# Patient Record
Sex: MALE | URBAN - METROPOLITAN AREA
[De-identification: names, ages, dates, MRNs, and addresses within clinical notes are randomized per-mention and may not be internally consistent; named-entity substitution may affect disease eponyms.]

---

## 2023-05-18 ASSESSMENT — PROMIS GLOBAL HEALTH SCALE
RATE_AVERAGE_FATIGUE: MODERATE
RATE_PHYSICAL_HEALTH: FAIR
RATE_MENTAL_HEALTH: GOOD
RATE_QUALITY_OF_LIFE: FAIR
RATE_GENERAL_HEALTH: FAIR
RATE_SOCIAL_SATISFACTION: GOOD
RATE_AVERAGE_PAIN: 3
CARRYOUT_SOCIAL_ACTIVITIES: FAIR
EMOTIONAL_PROBLEMS: SOMETIMES
CARRYOUT_PHYSICAL_ACTIVITIES: MOSTLY

## 2023-05-19 ENCOUNTER — OFFICE VISIT (OUTPATIENT)
Dept: PRIMARY CARE | Facility: CLINIC | Age: 58
End: 2023-05-19
Payer: COMMERCIAL

## 2023-05-19 VITALS
SYSTOLIC BLOOD PRESSURE: 128 MMHG | DIASTOLIC BLOOD PRESSURE: 88 MMHG | TEMPERATURE: 98.3 F | HEART RATE: 53 BPM | OXYGEN SATURATION: 95 % | WEIGHT: 232 LBS | RESPIRATION RATE: 18 BRPM | BODY MASS INDEX: 29.79 KG/M2

## 2023-05-19 DIAGNOSIS — Z00.00 HEALTHCARE MAINTENANCE: ICD-10-CM

## 2023-05-19 DIAGNOSIS — E78.5 HYPERLIPIDEMIA, UNSPECIFIED HYPERLIPIDEMIA TYPE: ICD-10-CM

## 2023-05-19 DIAGNOSIS — F17.200 SMOKER: ICD-10-CM

## 2023-05-19 DIAGNOSIS — I10 HYPERTENSION, UNSPECIFIED TYPE: Primary | ICD-10-CM

## 2023-05-19 PROCEDURE — 99213 OFFICE O/P EST LOW 20 MIN: CPT | Performed by: FAMILY MEDICINE

## 2023-05-19 RX ORDER — METOPROLOL TARTRATE 50 MG/1
50 TABLET ORAL DAILY
Qty: 90 TABLET | Refills: 1 | Status: SHIPPED | OUTPATIENT
Start: 2023-05-19 | End: 2023-11-21 | Stop reason: SDUPTHER

## 2023-05-19 RX ORDER — LOSARTAN POTASSIUM 25 MG/1
25 TABLET ORAL DAILY
Qty: 90 TABLET | Refills: 1 | Status: SHIPPED | OUTPATIENT
Start: 2023-05-19 | End: 2023-11-08

## 2023-05-19 RX ORDER — SIMVASTATIN 20 MG/1
1 TABLET, FILM COATED ORAL EVERY EVENING
COMMUNITY
Start: 2012-01-27 | End: 2023-05-19 | Stop reason: SDUPTHER

## 2023-05-19 RX ORDER — LOSARTAN POTASSIUM 25 MG/1
1 TABLET ORAL DAILY
COMMUNITY
Start: 2020-08-21 | End: 2023-05-19 | Stop reason: SDUPTHER

## 2023-05-19 RX ORDER — ESOMEPRAZOLE MAGNESIUM 40 MG/1
1 CAPSULE, DELAYED RELEASE ORAL DAILY
COMMUNITY
Start: 2012-01-27

## 2023-05-19 RX ORDER — SIMVASTATIN 20 MG/1
20 TABLET, FILM COATED ORAL EVERY EVENING
Qty: 90 TABLET | Refills: 1 | Status: SHIPPED | OUTPATIENT
Start: 2023-05-19 | End: 2023-12-26 | Stop reason: SDUPTHER

## 2023-05-19 RX ORDER — METOPROLOL TARTRATE 50 MG/1
1 TABLET ORAL DAILY
COMMUNITY
Start: 2014-06-19 | End: 2023-05-19 | Stop reason: SDUPTHER

## 2023-05-19 NOTE — PROGRESS NOTES
Subjective   Patient ID: Christian Toro is a 57 y.o. male who presents for Med Refill.    HPI   Patient comes in today for 6-month checkup and refill of medications.  He is currently without complaints.  He was in for checkup, lab work and EKG back in November 2022.  His labs are good until this November.  He is a smoker of less than a pack a day.  He is not interested in quitting at this time.  Because of his blood pressure medication and cholesterol medication we will check a coronary artery calcium score.        4/19/2022  Patient comes in today for evaluation of his right knee. It began last Friday for no apparent reason. He states he got out of his truck to walk his dog and did not injured in any way but got not far from the truck and wondered how he was going to get back as his knee was hurting. He describes pain in the knee, behind the knee and in the right lower leg. He denies calf pain. The right leg is slightly larger than the left. There is no visible sign of ecchymosis. Right knee is swollen and tender along the medial collateral ligament.     Patient also notes that he did slam his right knee into a chair a few weeks ago and it was tender for a few days but it seemed to get better.    1  2/25/2022  Patient comes in today for checkup and follow-up on his meds. He is complaining of some ear issues. He is otherwise without complaints.    Patient describing some abnormalities in the chest where he does not feel like he is breathing properly.    7/30/2021  Patient presents today for 6-month checkup and refill of meds. He currently is without complaints. He states that he is taking his medicines as directed and is not having any side effects from them. His 86-year-old father passed away back at the beginning of the year from lung cancer. He had just moved back to the area after being away many years as he was  from his wife. Patient was able to spend the last 3 weeks of his father's life with him and  "taking care of him. He is grateful for having that opportunity.    His mother is 83 years old and resides at [facility].    Patient has been working and doing well with his medications. He needs updated lab work.    8/21/2020  Patient comes in today for follow-up on his blood pressure medicine. Apparently he has been told by insurance that they are no longer going to cover his Diovan medication. This was working well for him and he was having problems with flushing with the generic valsartan. He is not sure what to do now. Blood pressure medicine for him. Apparently he has tried Toprol and Prinivil in the past as well as Bystolic. Currently his pulse rate is 64 on metoprolol 50 mg a day. Will switch Diovan to losartan.    Patient also having problems with both ears. He is having difficulty hearing. On exam he has cerumen impactions bilaterally.    Since he was here last he did have an incarcerated ventral hernia repair on 5/28/2020 by Dr. Bourne. He did have intussusception repair as an infant.    11/15/19  Patient comes in today for checkup and refill of medication. He currently is having some issues with his herniation repairs and is waiting for Dr. Bourne to come back into town to talk to him. He otherwise is without complaints. He has moved to the [facility] and is a 6 minute drive from work now. He is happy about that.    Family history: His 86-year-old mother was recently diagnosed with pulmonary fibrosis and is not doing well. His father is 83 years old and is a square dance caller down in [place]. He has one brother who is quite obese and has some health issues.    Patient is due for lab work today.     9/14/2018   The patient is a 52 year old male who presents with a groin sprain/strain. The patient sustained an injury to the right groin. Symptoms include pain and swelling. The pain radiates to the abdomen. Note for \"Groin sprain/strain\": patient comes in today very anxious over POSSIBLE " HERNIA in the right inr nal region.  he states he noticed it about 2 or 3 weeks ago.  He has been also suffering from an umbilical and ventral hernia.  He would like to have them evaluated and repaired if necessary.    patient is also very anxious patient and chews his nails.     Review of Systems   All other systems reviewed and are negative.      Objective   Pulse 53   Temp 36.8 °C (98.3 °F)   Resp 18   Wt 105 kg (232 lb)   SpO2 95%   BMI 29.79 kg/m²     Physical Exam  Vitals reviewed.   Constitutional:       Appearance: He is well-developed.   HENT:      Head: Normocephalic and atraumatic.      Right Ear: Tympanic membrane, ear canal and external ear normal.      Left Ear: Tympanic membrane, ear canal and external ear normal.      Nose: Nose normal.      Mouth/Throat:      Mouth: Mucous membranes are moist.      Pharynx: Oropharynx is clear.   Eyes:      Extraocular Movements: Extraocular movements intact.      Conjunctiva/sclera: Conjunctivae normal.      Pupils: Pupils are equal, round, and reactive to light.   Cardiovascular:      Rate and Rhythm: Normal rate and regular rhythm.      Heart sounds: Normal heart sounds. No murmur heard.  Pulmonary:      Effort: Pulmonary effort is normal.      Breath sounds: Normal breath sounds. No wheezing.   Abdominal:      General: Abdomen is flat. Bowel sounds are normal.      Palpations: Abdomen is soft.   Musculoskeletal:         General: Normal range of motion.   Skin:     General: Skin is warm and dry.   Neurological:      General: No focal deficit present.      Mental Status: He is alert and oriented to person, place, and time. Mental status is at baseline.   Psychiatric:         Mood and Affect: Mood normal.         Behavior: Behavior normal.         Thought Content: Thought content normal.         Judgment: Judgment normal.         Assessment/Plan   Problem List Items Addressed This Visit       Hypertension - Primary    Relevant Medications    losartan (Cozaar)  25 mg tablet    metoprolol tartrate (Lopressor) 50 mg tablet    Hyperlipidemia    Relevant Medications    simvastatin (Zocor) 20 mg tablet    Smoker    Relevant Orders    CT lung screening low dose    Healthcare maintenance    Relevant Orders    CT cardiac scoring wo IV contrast   Will contact patient with test results when available.  Continue current meds as directed.  Follow up in 6 months for recheck if all remains stable, sooner if problems arise.  Can do virtual in 6 months and labs in a year.  Medication list reconciled.  Thank you for visiting today!      Professional services: Some of this note was completed using Dragon voice technology and sometimes the software misinterprets words. This may include unintended errors with respect to translation of words, typographical errors or grammar errors which may not have been identified prior to finalization of the chart note. Please take this into account when reading the note. Thank you.

## 2023-10-04 ENCOUNTER — TELEPHONE (OUTPATIENT)
Dept: PRIMARY CARE | Facility: CLINIC | Age: 58
End: 2023-10-04
Payer: COMMERCIAL

## 2023-10-04 NOTE — TELEPHONE ENCOUNTER
PA for low dose CT scan has been approved. It is valid for 30 days from today.   Auth # [id number]

## 2023-10-11 ENCOUNTER — APPOINTMENT (OUTPATIENT)
Dept: PRIMARY CARE | Facility: CLINIC | Age: 58
End: 2023-10-11
Payer: COMMERCIAL

## 2023-10-18 PROBLEM — S83.411A SPRAIN OF MEDIAL COLLATERAL LIGAMENT OF RIGHT KNEE: Status: RESOLVED | Noted: 2023-10-18 | Resolved: 2023-10-18

## 2023-10-18 PROBLEM — K56.1: Status: ACTIVE | Noted: 2023-10-18

## 2023-10-18 PROBLEM — K42.9 UMBILICAL HERNIA WITHOUT OBSTRUCTION AND WITHOUT GANGRENE: Status: RESOLVED | Noted: 2023-10-18 | Resolved: 2023-10-18

## 2023-10-18 PROBLEM — K43.6 INCARCERATED VENTRAL HERNIA: Status: RESOLVED | Noted: 2023-10-18 | Resolved: 2023-10-18

## 2023-10-18 PROBLEM — E53.8 VITAMIN B12 DEFICIENCY: Status: ACTIVE | Noted: 2023-10-18

## 2023-10-18 PROBLEM — D22.5 MELANOCYTIC NEVI OF TRUNK: Status: ACTIVE | Noted: 2020-02-11

## 2023-10-18 PROBLEM — L71.9 ROSACEA, UNSPECIFIED: Status: ACTIVE | Noted: 2020-02-11

## 2023-10-18 PROBLEM — T81.41XA SUPERFICIAL INCISIONAL SURGICAL SITE INFECTION: Status: RESOLVED | Noted: 2023-10-18 | Resolved: 2023-10-18

## 2023-10-18 PROBLEM — K58.9 IRRITABLE BOWEL SYNDROME: Status: ACTIVE | Noted: 2023-10-18

## 2023-10-18 PROBLEM — D18.01 HEMANGIOMA OF SKIN AND SUBCUTANEOUS TISSUE: Status: ACTIVE | Noted: 2020-02-11

## 2023-10-18 PROBLEM — N40.0 BENIGN PROSTATIC HYPERPLASIA: Status: ACTIVE | Noted: 2023-10-18

## 2023-10-18 PROBLEM — K40.90 RIGHT INGUINAL HERNIA: Status: RESOLVED | Noted: 2023-10-18 | Resolved: 2023-10-18

## 2023-10-18 PROBLEM — H61.23 BILATERAL HEARING LOSS DUE TO CERUMEN IMPACTION: Status: RESOLVED | Noted: 2023-10-18 | Resolved: 2023-10-18

## 2023-10-18 PROBLEM — L81.4 OTHER MELANIN HYPERPIGMENTATION: Status: RESOLVED | Noted: 2020-02-11 | Resolved: 2023-10-18

## 2023-10-18 PROBLEM — E55.9 VITAMIN D DEFICIENCY: Status: ACTIVE | Noted: 2023-10-18

## 2023-10-18 PROBLEM — K40.30 INCARCERATED RIGHT INGUINAL HERNIA: Status: RESOLVED | Noted: 2023-10-18 | Resolved: 2023-10-18

## 2023-10-18 PROBLEM — L82.1 OTHER SEBORRHEIC KERATOSIS: Status: RESOLVED | Noted: 2020-02-11 | Resolved: 2023-10-18

## 2023-10-18 PROBLEM — N42.9 PROSTATE DISORDER: Status: ACTIVE | Noted: 2023-10-18

## 2023-10-18 PROBLEM — D22.30 MELANOCYTIC NEVI OF UNSPECIFIED PART OF FACE: Status: ACTIVE | Noted: 2020-02-11

## 2023-10-18 PROBLEM — L98.9 SKIN LESION OF FACE: Status: RESOLVED | Noted: 2023-10-18 | Resolved: 2023-10-18

## 2023-10-18 PROBLEM — R79.9 ABNORMAL BLOOD CHEMISTRY: Status: RESOLVED | Noted: 2023-10-18 | Resolved: 2023-10-18

## 2023-10-18 PROBLEM — H10.30 ACUTE CONJUNCTIVITIS: Status: RESOLVED | Noted: 2023-10-18 | Resolved: 2023-10-18

## 2023-10-18 PROBLEM — B35.1 ONYCHOMYCOSIS OF TOENAIL: Status: RESOLVED | Noted: 2023-10-18 | Resolved: 2023-10-18

## 2023-10-18 PROBLEM — C44.321 SQUAMOUS CELL CARCINOMA OF SKIN OF NOSE: Status: ACTIVE | Noted: 2020-02-11

## 2023-10-18 PROBLEM — L91.8 OTHER HYPERTROPHIC DISORDERS OF THE SKIN: Status: RESOLVED | Noted: 2020-02-11 | Resolved: 2023-10-18

## 2023-10-18 PROBLEM — G89.18 ACUTE POSTOPERATIVE PAIN: Status: RESOLVED | Noted: 2020-05-28 | Resolved: 2023-10-18

## 2023-10-18 PROBLEM — J98.11 ATELECTASIS, RIGHT: Status: ACTIVE | Noted: 2023-10-18

## 2023-10-18 PROBLEM — R10.9 ABDOMINAL PAIN: Status: RESOLVED | Noted: 2023-10-18 | Resolved: 2023-10-18

## 2023-10-18 PROBLEM — D48.5 NEOPLASM OF UNCERTAIN BEHAVIOR OF SKIN: Status: ACTIVE | Noted: 2020-02-11

## 2023-10-18 PROBLEM — M62.08 DIASTASIS RECTI: Status: ACTIVE | Noted: 2023-10-18

## 2023-10-20 ENCOUNTER — ANCILLARY PROCEDURE (OUTPATIENT)
Dept: RADIOLOGY | Facility: CLINIC | Age: 58
End: 2023-10-20
Payer: COMMERCIAL

## 2023-10-20 DIAGNOSIS — F17.200 NICOTINE DEPENDENCE, UNSPECIFIED, UNCOMPLICATED: ICD-10-CM

## 2023-10-20 DIAGNOSIS — Z00.00 ENCOUNTER FOR GENERAL ADULT MEDICAL EXAMINATION WITHOUT ABNORMAL FINDINGS: ICD-10-CM

## 2023-10-20 PROCEDURE — 71271 CT THORAX LUNG CANCER SCR C-: CPT | Performed by: RADIOLOGY

## 2023-10-20 PROCEDURE — 71271 CT THORAX LUNG CANCER SCR C-: CPT

## 2023-10-20 PROCEDURE — 75571 CT HRT W/O DYE W/CA TEST: CPT

## 2023-10-24 ENCOUNTER — TELEPHONE (OUTPATIENT)
Dept: PRIMARY CARE | Facility: CLINIC | Age: 58
End: 2023-10-24
Payer: COMMERCIAL

## 2023-10-24 DIAGNOSIS — E27.9 ADRENAL ABNORMALITY (MULTI): ICD-10-CM

## 2023-10-24 PROBLEM — K76.0 FATTY LIVER: Status: ACTIVE | Noted: 2023-10-24

## 2023-10-24 PROBLEM — I77.810 DILATION OF THORACIC AORTA (CMS-HCC): Status: ACTIVE | Noted: 2023-10-24

## 2023-10-24 PROBLEM — Z87.39 H/O DEGENERATIVE DISC DISEASE: Status: ACTIVE | Noted: 2023-10-24

## 2023-10-24 PROBLEM — R91.1 LUNG NODULE SEEN ON IMAGING STUDY: Status: ACTIVE | Noted: 2023-10-24

## 2023-10-24 NOTE — TELEPHONE ENCOUNTER
----- Message from Sarah Bunch MD sent at 10/24/2023  7:01 AM EDT -----  Call pt, has 2mm RUL lung nodule, 4.6 cm aneurysmal dilatation of thoracic aorta, fatty liver, possible bilateral adrenal adenomas and DDD in spine. Rec is to repeat LDCT  surveillance in 1 yr. .  Should see endo if he has not for possible adenomas.   He should start a low chol/fat diet, and most importantly stop smoking if he has not done so.

## 2023-10-31 ENCOUNTER — OFFICE VISIT (OUTPATIENT)
Dept: PRIMARY CARE | Facility: CLINIC | Age: 58
End: 2023-10-31
Payer: COMMERCIAL

## 2023-10-31 VITALS
HEART RATE: 52 BPM | HEIGHT: 74 IN | BODY MASS INDEX: 29.77 KG/M2 | WEIGHT: 232 LBS | TEMPERATURE: 98.3 F | SYSTOLIC BLOOD PRESSURE: 142 MMHG | RESPIRATION RATE: 20 BRPM | DIASTOLIC BLOOD PRESSURE: 88 MMHG

## 2023-10-31 DIAGNOSIS — R31.9 HEMATURIA, UNSPECIFIED TYPE: ICD-10-CM

## 2023-10-31 LAB
POC APPEARANCE, URINE: CLEAR
POC BILIRUBIN, URINE: NEGATIVE
POC BLOOD, URINE: NEGATIVE
POC COLOR, URINE: YELLOW
POC GLUCOSE, URINE: NEGATIVE MG/DL
POC KETONES, URINE: NEGATIVE MG/DL
POC LEUKOCYTES, URINE: NEGATIVE
POC NITRITE,URINE: NEGATIVE
POC PH, URINE: 7 PH
POC PROTEIN, URINE: NEGATIVE MG/DL
POC SPECIFIC GRAVITY, URINE: 1.02
POC UROBILINOGEN, URINE: 0.2 EU/DL

## 2023-10-31 PROCEDURE — 81003 URINALYSIS AUTO W/O SCOPE: CPT | Performed by: FAMILY MEDICINE

## 2023-10-31 PROCEDURE — 99214 OFFICE O/P EST MOD 30 MIN: CPT | Performed by: FAMILY MEDICINE

## 2023-10-31 ASSESSMENT — ENCOUNTER SYMPTOMS
BLOOD IN STOOL: 0
SORE THROAT: 0
WHEEZING: 0
DYSURIA: 0
COUGH: 1
BRUISES/BLEEDS EASILY: 0
RHINORRHEA: 0
DIARRHEA: 0
FEVER: 0
HEMATURIA: 1
SHORTNESS OF BREATH: 0
CONSTIPATION: 0
NAUSEA: 0
VOMITING: 0

## 2023-10-31 NOTE — PROGRESS NOTES
"Subjective   Patient ID: Christian Toro is a 57 y.o. male who presents for Blood in Urine (Pt had some urinary incontinence about 3-4wks ago and noticed blood in urine. He has since noticed that his urine is darker. ) and Follow-up (6m med check).    HPI     Review of Systems   Constitutional:  Negative for fever.        Pt with htn, hld, doing well on meds. No rf of meds needed at this time  Pt had ct card calcium score, normal  Had LDCT, showed tiny pulm nodule, thoraicic aorta dilation requiring surveillance in 1 yr.   Pt aware   HENT:  Negative for congestion, ear pain, rhinorrhea and sore throat.         2 wk ago had uri   Respiratory:  Positive for cough. Negative for shortness of breath and wheezing.         Pt is a smoker   Gastrointestinal:  Negative for blood in stool, constipation, diarrhea, nausea and vomiting.   Genitourinary:  Positive for hematuria. Negative for dysuria, scrotal swelling and testicular pain.        4 wk ago had urinary urgency and noted blood  after he pinched urethra to try to stop the flow.  No personal hx of kidney stones, brother has kidney stones.  Occ notes urine is dark.  Drinks approx 8 oz/d   Hematological:  Does not bruise/bleed easily.       Objective   /88   Pulse 52   Temp 36.8 °C (98.3 °F)   Resp 20   Ht 1.88 m (6' 2\")   Wt 105 kg (232 lb)   BMI 29.79 kg/m²     Physical Exam  Vitals and nursing note reviewed.   Constitutional:       General: He is not in acute distress.     Appearance: Normal appearance.   HENT:      Head: Normocephalic and atraumatic.   Cardiovascular:      Rate and Rhythm: Normal rate and regular rhythm.      Heart sounds: Normal heart sounds.   Pulmonary:      Effort: Pulmonary effort is normal.      Breath sounds: Normal breath sounds.   Abdominal:      General: Bowel sounds are normal.      Palpations: Abdomen is soft. There is no mass.      Tenderness: There is no abdominal tenderness. There is no right CVA tenderness or left CVA " tenderness.   Genitourinary:     Comments: Pt declined genital/rectal exam today  Skin:     General: Skin is warm and dry.   Neurological:      Mental Status: He is alert.         Assessment/Plan   Problem List Items Addressed This Visit             ICD-10-CM    Hematuria R31.9     Advise pt ua is normal today,,will send uc  Will get us of kidneys  Pt declined urol referral at this time  F/up if any worsening symptoms         Relevant Orders    POCT UA Automated manually resulted (Completed)    Urine Culture    US renal complete

## 2023-10-31 NOTE — ASSESSMENT & PLAN NOTE
Advise pt ua is normal today,,will send uc  Will get us of kidneys  Pt declined urol referral at this time  F/up if any worsening symptoms

## 2023-11-08 DIAGNOSIS — I10 HYPERTENSION, UNSPECIFIED TYPE: ICD-10-CM

## 2023-11-08 RX ORDER — LOSARTAN POTASSIUM 25 MG/1
25 TABLET ORAL DAILY
Qty: 90 TABLET | Refills: 1 | Status: SHIPPED | OUTPATIENT
Start: 2023-11-08 | End: 2024-03-15 | Stop reason: SDUPTHER

## 2023-11-08 NOTE — TELEPHONE ENCOUNTER
Requested Prescriptions     Pending Prescriptions Disp Refills    losartan (Cozaar) 25 mg tablet [Pharmacy Med Name: LOSARTAN TAB 25MG] 90 tablet 1     Sig: TAKE 1 TABLET ONCE DAILY

## 2023-11-10 ENCOUNTER — ANCILLARY PROCEDURE (OUTPATIENT)
Dept: RADIOLOGY | Facility: CLINIC | Age: 58
End: 2023-11-10
Payer: COMMERCIAL

## 2023-11-10 DIAGNOSIS — R31.9 HEMATURIA, UNSPECIFIED TYPE: ICD-10-CM

## 2023-11-10 PROCEDURE — 76770 US EXAM ABDO BACK WALL COMP: CPT

## 2023-11-10 PROCEDURE — 76770 US EXAM ABDO BACK WALL COMP: CPT | Performed by: STUDENT IN AN ORGANIZED HEALTH CARE EDUCATION/TRAINING PROGRAM

## 2023-11-21 DIAGNOSIS — I10 HYPERTENSION, UNSPECIFIED TYPE: ICD-10-CM

## 2023-11-21 RX ORDER — METOPROLOL TARTRATE 50 MG/1
50 TABLET ORAL DAILY
Qty: 90 TABLET | Refills: 1 | Status: SHIPPED | OUTPATIENT
Start: 2023-11-21 | End: 2024-02-23 | Stop reason: SDUPTHER

## 2023-11-21 NOTE — TELEPHONE ENCOUNTER
Patient is requesting a refill on his       Metoprolol 50 mg      [facility] to [facility]       Thank You       Patient last seen 10/31/23

## 2023-11-21 NOTE — TELEPHONE ENCOUNTER
Requested Prescriptions     Pending Prescriptions Disp Refills    metoprolol tartrate (Lopressor) 50 mg tablet 90 tablet 1     Sig: Take 1 tablet by mouth once daily.

## 2023-12-26 ENCOUNTER — TELEPHONE (OUTPATIENT)
Dept: PRIMARY CARE | Facility: CLINIC | Age: 58
End: 2023-12-26
Payer: COMMERCIAL

## 2023-12-26 DIAGNOSIS — E78.5 HYPERLIPIDEMIA, UNSPECIFIED HYPERLIPIDEMIA TYPE: ICD-10-CM

## 2023-12-26 RX ORDER — SIMVASTATIN 20 MG/1
20 TABLET, FILM COATED ORAL EVERY EVENING
Qty: 90 TABLET | Refills: 0 | Status: SHIPPED | OUTPATIENT
Start: 2023-12-26 | End: 2024-03-15 | Stop reason: SDUPTHER

## 2023-12-26 NOTE — TELEPHONE ENCOUNTER
Patient is asking for a refill on his       Simvastatin 20 mg       Sent to [facility]       Thank You       Patient last seen on 10/31/23

## 2024-02-23 ENCOUNTER — TELEPHONE (OUTPATIENT)
Dept: PRIMARY CARE | Facility: CLINIC | Age: 59
End: 2024-02-23
Payer: COMMERCIAL

## 2024-02-23 DIAGNOSIS — I10 HYPERTENSION, UNSPECIFIED TYPE: ICD-10-CM

## 2024-02-23 RX ORDER — METOPROLOL TARTRATE 50 MG/1
50 TABLET ORAL DAILY
Qty: 90 TABLET | Refills: 1 | Status: SHIPPED | OUTPATIENT
Start: 2024-02-23 | End: 2024-08-21

## 2024-02-23 NOTE — TELEPHONE ENCOUNTER
Patient is requesting a refill on his       Metoprolol 50 mg      Sent to [facility] in [place]      Thank You         Patient last seen on 10/31/23

## 2024-02-23 NOTE — TELEPHONE ENCOUNTER
Patient requests prescription below    Last Office Visit: 10/31/2023     Requested Prescriptions     Pending Prescriptions Disp Refills    metoprolol tartrate (Lopressor) 50 mg tablet 90 tablet 1     Sig: Take 1 tablet by mouth once daily.

## 2024-02-26 NOTE — TELEPHONE ENCOUNTER
Patient is stating that Naldo Laboy did not get his prescription.  Patient is asking for it to be resent.  Patient can be reached at [phone].        Thank You

## 2024-03-15 ENCOUNTER — OFFICE VISIT (OUTPATIENT)
Dept: PRIMARY CARE | Facility: CLINIC | Age: 59
End: 2024-03-15
Payer: COMMERCIAL

## 2024-03-15 VITALS
OXYGEN SATURATION: 96 % | WEIGHT: 230 LBS | BODY MASS INDEX: 29.53 KG/M2 | RESPIRATION RATE: 20 BRPM | SYSTOLIC BLOOD PRESSURE: 157 MMHG | HEART RATE: 55 BPM | DIASTOLIC BLOOD PRESSURE: 106 MMHG | TEMPERATURE: 98 F

## 2024-03-15 DIAGNOSIS — E78.5 HYPERLIPIDEMIA, UNSPECIFIED HYPERLIPIDEMIA TYPE: ICD-10-CM

## 2024-03-15 DIAGNOSIS — I10 HYPERTENSION, UNSPECIFIED TYPE: ICD-10-CM

## 2024-03-15 DIAGNOSIS — H61.23 BILATERAL IMPACTED CERUMEN: Primary | ICD-10-CM

## 2024-03-15 PROCEDURE — 99213 OFFICE O/P EST LOW 20 MIN: CPT | Performed by: FAMILY MEDICINE

## 2024-03-15 PROCEDURE — 69209 REMOVE IMPACTED EAR WAX UNI: CPT | Performed by: FAMILY MEDICINE

## 2024-03-15 RX ORDER — SIMVASTATIN 20 MG/1
20 TABLET, FILM COATED ORAL EVERY EVENING
Qty: 90 TABLET | Refills: 0 | Status: SHIPPED | OUTPATIENT
Start: 2024-03-15 | End: 2024-09-11

## 2024-03-15 RX ORDER — LOSARTAN POTASSIUM 25 MG/1
25 TABLET ORAL DAILY
Qty: 90 TABLET | Refills: 1 | Status: SHIPPED | OUTPATIENT
Start: 2024-03-15

## 2024-03-15 NOTE — PROGRESS NOTES
Subjective   Patient ID: Christian Toro is a 58 y.o. male who presents for Follow-up (6 mo med fu. No questions, concerns, or complaints. ///).    HPI   Patient presents today for 6-month checkup and refill of meds. He currently is without complaints. He states that he is taking his medicines as directed and is not having any side effects from them.    5/19/2023  Patient comes in today for 6-month checkup and refill of medications.  He is currently without complaints.  He was in for checkup, lab work and EKG back in November 2022.  His labs are good until this November.  He is a smoker of less than a pack a day.  He is not interested in quitting at this time.  Because of his blood pressure medication and cholesterol medication we will check a coronary artery calcium score.    Review of Systems   All other systems reviewed and are negative.      Objective   BP (!) 157/106   Pulse 55   Temp 36.7 °C (98 °F)   Resp 20   Wt 104 kg (230 lb)   SpO2 96%   BMI 29.53 kg/m²     Physical Exam  Vitals reviewed.   Constitutional:       Appearance: He is well-developed.   HENT:      Head: Normocephalic and atraumatic.      Right Ear: External ear normal. There is impacted cerumen (Cerumen removed from right ear with irrigation and instrumentation.  Patient tolerated procedure well.  After the cerumen was removed the ear was rechecked and no evidence of infection was found.).      Left Ear: External ear normal. There is impacted cerumen (Cerumen removed from left ear with irrigation and instrumentation.  Patient tolerated procedure well.  After the cerumen was removed the ear was rechecked and no evidence of infection was found.).      Nose: Nose normal.      Mouth/Throat:      Mouth: Mucous membranes are moist.      Pharynx: Oropharynx is clear.   Eyes:      Extraocular Movements: Extraocular movements intact.      Conjunctiva/sclera: Conjunctivae normal.      Pupils: Pupils are equal, round, and reactive to light.       Comments: Patient wears glasses   Cardiovascular:      Rate and Rhythm: Normal rate and regular rhythm.      Heart sounds: Normal heart sounds. No murmur heard.  Pulmonary:      Effort: Pulmonary effort is normal.      Breath sounds: Normal breath sounds. No wheezing.   Abdominal:      General: Abdomen is flat. Bowel sounds are normal.      Palpations: Abdomen is soft.   Musculoskeletal:         General: Normal range of motion.   Skin:     General: Skin is warm and dry.   Neurological:      General: No focal deficit present.      Mental Status: He is alert and oriented to person, place, and time. Mental status is at baseline.   Psychiatric:         Mood and Affect: Mood normal.         Behavior: Behavior normal.         Thought Content: Thought content normal.         Judgment: Judgment normal.       Assessment/Plan   Problem List Items Addressed This Visit             ICD-10-CM    Hypertension I10    Relevant Medications    losartan (Cozaar) 25 mg tablet    Hyperlipidemia E78.5    Relevant Medications    simvastatin (Zocor) 20 mg tablet     Other Visit Diagnoses         Codes    Bilateral impacted cerumen    -  Primary H61.23        Continue current meds as directed.  Follow up in 6 months for recheck if all remains stable, sooner if problems arise.  Medication list reconciled.  Thank you for visiting today!      Professional services: Some of this note was completed using Dragon voice technology and sometimes the software misinterprets words. This may include unintended errors with respect to translation of words, typographical errors or grammar errors which may not have been identified prior to finalization of the chart note. Please take this into account when reading the note. Thank you.

## 2024-06-17 DIAGNOSIS — E78.5 HYPERLIPIDEMIA, UNSPECIFIED HYPERLIPIDEMIA TYPE: ICD-10-CM

## 2024-06-17 NOTE — TELEPHONE ENCOUNTER
Requested Prescriptions     Pending Prescriptions Disp Refills    simvastatin (Zocor) 20 mg tablet 90 tablet 0     Sig: Take 1 tablet (20 mg) by mouth once daily in the evening.

## 2024-06-17 NOTE — TELEPHONE ENCOUNTER
Rx Refill Request Telephone Encounter    Name:  Christian Toro  :  [id number]  Medication Name:  simvastatin (Zocor) 20 mg tablet       Specific Pharmacy location:    [id number] - [place], [place] - [address] - [place], [place] Phone: [phone]   Fax: [phone]        Date of last appointment:  03/15/24  Date of next appointment:  09/15/24  Best number to reach patient:  [phone]        Thank You

## 2024-06-18 RX ORDER — SIMVASTATIN 20 MG/1
20 TABLET, FILM COATED ORAL EVERY EVENING
Qty: 90 TABLET | Refills: 0 | Status: SHIPPED | OUTPATIENT
Start: 2024-06-18 | End: 2024-12-15

## 2024-08-21 DIAGNOSIS — I10 HYPERTENSION, UNSPECIFIED TYPE: ICD-10-CM

## 2024-08-21 RX ORDER — METOPROLOL TARTRATE 50 MG/1
50 TABLET ORAL DAILY
Qty: 30 TABLET | Refills: 0 | Status: SHIPPED | OUTPATIENT
Start: 2024-08-21 | End: 2024-09-20

## 2024-08-21 NOTE — TELEPHONE ENCOUNTER
Rx Refill Request Telephone Encounter    Name:  Christian Toro  :  [id number]  Medication Name:  metoprolol tartrate (Lopressor) 50 mg tablet         Specific Pharmacy location:    [id number] [address] Phone: [phone]   Fax: [phone]        Date of last appointment:  03/15/24  Date of next appointment:  24  Best number to reach patient:  [phone]      Thank You

## 2024-08-21 NOTE — TELEPHONE ENCOUNTER
Requested Prescriptions     Pending Prescriptions Disp Refills    metoprolol tartrate (Lopressor) 50 mg tablet 30 tablet 0     Sig: Take 1 tablet by mouth once daily.

## 2024-09-13 ENCOUNTER — APPOINTMENT (OUTPATIENT)
Dept: PRIMARY CARE | Facility: CLINIC | Age: 59
End: 2024-09-13
Payer: COMMERCIAL

## 2024-09-13 VITALS
RESPIRATION RATE: 20 BRPM | HEART RATE: 59 BPM | TEMPERATURE: 97.8 F | SYSTOLIC BLOOD PRESSURE: 126 MMHG | BODY MASS INDEX: 29.4 KG/M2 | WEIGHT: 229 LBS | OXYGEN SATURATION: 93 % | DIASTOLIC BLOOD PRESSURE: 88 MMHG

## 2024-09-13 DIAGNOSIS — I10 HYPERTENSION, ESSENTIAL, BENIGN: ICD-10-CM

## 2024-09-13 DIAGNOSIS — E53.8 VITAMIN B12 DEFICIENCY: ICD-10-CM

## 2024-09-13 DIAGNOSIS — E78.5 HYPERLIPIDEMIA, UNSPECIFIED HYPERLIPIDEMIA TYPE: ICD-10-CM

## 2024-09-13 DIAGNOSIS — K21.9 GASTROESOPHAGEAL REFLUX DISEASE WITHOUT ESOPHAGITIS: ICD-10-CM

## 2024-09-13 DIAGNOSIS — E78.2 HYPERLIPIDEMIA, MIXED: ICD-10-CM

## 2024-09-13 DIAGNOSIS — I10 HYPERTENSION, UNSPECIFIED TYPE: ICD-10-CM

## 2024-09-13 DIAGNOSIS — N40.0 BENIGN PROSTATIC HYPERPLASIA, UNSPECIFIED WHETHER LOWER URINARY TRACT SYMPTOMS PRESENT: ICD-10-CM

## 2024-09-13 DIAGNOSIS — E55.9 VITAMIN D DEFICIENCY: Primary | ICD-10-CM

## 2024-09-13 PROCEDURE — 99214 OFFICE O/P EST MOD 30 MIN: CPT | Performed by: FAMILY MEDICINE

## 2024-09-13 RX ORDER — SIMVASTATIN 20 MG/1
20 TABLET, FILM COATED ORAL EVERY EVENING
Qty: 30 TABLET | Refills: 5 | Status: SHIPPED | OUTPATIENT
Start: 2024-09-13 | End: 2025-03-12

## 2024-09-13 RX ORDER — METOPROLOL TARTRATE 50 MG/1
50 TABLET ORAL DAILY
Qty: 30 TABLET | Refills: 5 | Status: SHIPPED | OUTPATIENT
Start: 2024-09-13 | End: 2025-03-12

## 2024-09-13 RX ORDER — LOSARTAN POTASSIUM 25 MG/1
25 TABLET ORAL DAILY
Qty: 30 TABLET | Refills: 5 | Status: SHIPPED | OUTPATIENT
Start: 2024-09-13 | End: 2025-03-12

## 2024-09-13 RX ORDER — ESOMEPRAZOLE MAGNESIUM 40 MG/1
40 CAPSULE, DELAYED RELEASE ORAL DAILY
Qty: 30 CAPSULE | Refills: 5 | Status: SHIPPED | OUTPATIENT
Start: 2024-09-13 | End: 2025-03-12

## 2024-09-13 NOTE — PROGRESS NOTES
Subjective   Patient ID: Christian Toro is a 58 y.o. male who presents for Follow-up.  HPI  Patient presents today for 6-month checkup and refill of meds. He currently is without complaints. He states that he is taking his medicines as directed and is not having any side effects from them.    Patient has been under some stress of late as he is caring for his elderly mother and has to visit her every day.  She is now living in assisted living rather than independent living in [place], [place].  His brother lives in [place], [place] and has not been helpful.    Patient has a bald scalp and would like to follow-up with dermatology as he has had issues with skin cancer in the past.    3/15/2024  Patient presents today for 6-month checkup and refill of meds. He currently is without complaints. He states that he is taking his medicines as directed and is not having any side effects from them.    5/19/2023  Patient comes in today for 6-month checkup and refill of medications.  He is currently without complaints.  He was in for checkup, lab work and EKG back in November 2022.  His labs are good until this November.  He is a smoker of less than a pack a day.  He is not interested in quitting at this time.  Because of his blood pressure medication and cholesterol medication we will check a coronary artery calcium score.    Review of Systems   All other systems reviewed and are negative.      Objective   Vitals:  /88   Pulse 59   Temp 36.6 °C (97.8 °F)   Resp 20   Wt 104 kg (229 lb)   SpO2 93%   BMI 29.40 kg/m²     Physical Exam  Vitals reviewed.   Constitutional:       Appearance: He is well-developed.   HENT:      Head: Normocephalic and atraumatic.      Right Ear: External ear normal. There is impacted cerumen.      Left Ear: External ear normal. There is impacted cerumen.      Nose: Nose normal.      Mouth/Throat:      Mouth: Mucous membranes are moist.      Pharynx: Oropharynx is clear.   Eyes:      Extraocular  Movements: Extraocular movements intact.      Conjunctiva/sclera: Conjunctivae normal.      Pupils: Pupils are equal, round, and reactive to light.      Comments: Patient wears glasses   Cardiovascular:      Rate and Rhythm: Normal rate and regular rhythm.      Heart sounds: Normal heart sounds. No murmur heard.  Pulmonary:      Effort: Pulmonary effort is normal.      Breath sounds: Normal breath sounds. No wheezing.   Abdominal:      General: Abdomen is flat. Bowel sounds are normal.      Palpations: Abdomen is soft.   Musculoskeletal:         General: Normal range of motion.   Skin:     General: Skin is warm and dry.   Neurological:      General: No focal deficit present.      Mental Status: He is alert and oriented to person, place, and time. Mental status is at baseline.   Psychiatric:         Mood and Affect: Mood normal.         Behavior: Behavior normal.         Thought Content: Thought content normal.         Judgment: Judgment normal.         Assessment/Plan   Problem List Items Addressed This Visit       Hypertension    Relevant Medications    losartan (Cozaar) 25 mg tablet    metoprolol tartrate (Lopressor) 50 mg tablet    Hyperlipidemia    Relevant Medications    simvastatin (Zocor) 20 mg tablet    Benign prostatic hyperplasia    Relevant Orders    Prostate Specific Antigen    Vitamin B12 deficiency    Relevant Orders    CBC    Vitamin B12    Vitamin D deficiency - Primary    Relevant Orders    Vitamin D 25-Hydroxy,Total (for eval of Vitamin D levels)     Other Visit Diagnoses       Hyperlipidemia, mixed        Relevant Orders    Lipid Panel    Hypertension, essential, benign        Relevant Orders    Comprehensive Metabolic Panel    Gastroesophageal reflux disease without esophagitis        Relevant Medications    esomeprazole (NexIUM) 40 mg DR capsule        Will contact patient with lab results when available.  Continue current meds as directed.  Follow up in 6 months for recheck if all remains  stable, sooner if problems arise.  Medication list reconciled.  Thank you for visiting today!      Professional services: Some of this note was completed using Dragon voice technology and sometimes the software misinterprets words. This may include unintended errors with respect to translation of words, typographical errors or grammar errors which may not have been identified prior to finalization of the chart note. Please take this into account when reading the note. Thank you.       Rajeev Spain DO 09/14/24 11:23 AM

## 2024-09-23 ENCOUNTER — LAB (OUTPATIENT)
Dept: LAB | Facility: LAB | Age: 59
End: 2024-09-23
Payer: COMMERCIAL

## 2024-09-23 DIAGNOSIS — I10 HYPERTENSION, ESSENTIAL, BENIGN: ICD-10-CM

## 2024-09-23 DIAGNOSIS — E55.9 VITAMIN D DEFICIENCY: ICD-10-CM

## 2024-09-23 DIAGNOSIS — E53.8 VITAMIN B12 DEFICIENCY: ICD-10-CM

## 2024-09-23 DIAGNOSIS — E78.2 HYPERLIPIDEMIA, MIXED: ICD-10-CM

## 2024-09-23 DIAGNOSIS — N40.0 BENIGN PROSTATIC HYPERPLASIA, UNSPECIFIED WHETHER LOWER URINARY TRACT SYMPTOMS PRESENT: ICD-10-CM

## 2024-09-23 PROCEDURE — 80061 LIPID PANEL: CPT

## 2024-09-23 PROCEDURE — 82306 VITAMIN D 25 HYDROXY: CPT

## 2024-09-23 PROCEDURE — 80053 COMPREHEN METABOLIC PANEL: CPT

## 2024-09-23 PROCEDURE — 84153 ASSAY OF PSA TOTAL: CPT

## 2024-09-23 PROCEDURE — 36415 COLL VENOUS BLD VENIPUNCTURE: CPT

## 2024-09-23 PROCEDURE — 82607 VITAMIN B-12: CPT

## 2024-09-23 PROCEDURE — 85027 COMPLETE CBC AUTOMATED: CPT

## 2024-09-24 LAB
25(OH)D3 SERPL-MCNC: 33 NG/ML (ref 30–100)
ALBUMIN SERPL BCP-MCNC: 4.4 G/DL (ref 3.4–5)
ALP SERPL-CCNC: 74 U/L (ref 33–120)
ALT SERPL W P-5'-P-CCNC: 20 U/L (ref 10–52)
ANION GAP SERPL CALC-SCNC: 14 MMOL/L (ref 10–20)
AST SERPL W P-5'-P-CCNC: 20 U/L (ref 9–39)
BILIRUB SERPL-MCNC: 0.8 MG/DL (ref 0–1.2)
BUN SERPL-MCNC: 11 MG/DL (ref 6–23)
CALCIUM SERPL-MCNC: 9.1 MG/DL (ref 8.6–10.6)
CHLORIDE SERPL-SCNC: 106 MMOL/L (ref 98–107)
CHOLEST SERPL-MCNC: 167 MG/DL (ref 0–199)
CHOLESTEROL/HDL RATIO: 3.9
CO2 SERPL-SCNC: 27 MMOL/L (ref 21–32)
CREAT SERPL-MCNC: 1.03 MG/DL (ref 0.5–1.3)
EGFRCR SERPLBLD CKD-EPI 2021: 85 ML/MIN/1.73M*2
ERYTHROCYTE [DISTWIDTH] IN BLOOD BY AUTOMATED COUNT: 13 % (ref 11.5–14.5)
GLUCOSE SERPL-MCNC: 90 MG/DL (ref 74–99)
HCT VFR BLD AUTO: 51.2 % (ref 41–52)
HDLC SERPL-MCNC: 42.5 MG/DL
HGB BLD-MCNC: 17 G/DL (ref 13.5–17.5)
LDLC SERPL CALC-MCNC: 90 MG/DL
MCH RBC QN AUTO: 30 PG (ref 26–34)
MCHC RBC AUTO-ENTMCNC: 33.2 G/DL (ref 32–36)
MCV RBC AUTO: 90 FL (ref 80–100)
NON HDL CHOLESTEROL: 125 MG/DL (ref 0–149)
NRBC BLD-RTO: 0 /100 WBCS (ref 0–0)
PLATELET # BLD AUTO: 249 X10*3/UL (ref 150–450)
POTASSIUM SERPL-SCNC: 4.7 MMOL/L (ref 3.5–5.3)
PROT SERPL-MCNC: 6.7 G/DL (ref 6.4–8.2)
PSA SERPL-MCNC: 0.34 NG/ML
RBC # BLD AUTO: 5.67 X10*6/UL (ref 4.5–5.9)
SODIUM SERPL-SCNC: 142 MMOL/L (ref 136–145)
TRIGL SERPL-MCNC: 172 MG/DL (ref 0–149)
VIT B12 SERPL-MCNC: 285 PG/ML (ref 211–911)
VLDL: 34 MG/DL (ref 0–40)
WBC # BLD AUTO: 11.7 X10*3/UL (ref 4.4–11.3)

## 2024-10-09 ENCOUNTER — TELEPHONE (OUTPATIENT)
Dept: PRIMARY CARE | Facility: CLINIC | Age: 59
End: 2024-10-09
Payer: COMMERCIAL

## 2024-10-09 DIAGNOSIS — E53.8 VITAMIN B12 DEFICIENCY: Primary | ICD-10-CM

## 2024-10-09 RX ORDER — CYANOCOBALAMIN 1000 UG/ML
1000 INJECTION, SOLUTION INTRAMUSCULAR; SUBCUTANEOUS
Qty: 4 ML | Refills: 0 | Status: SHIPPED | OUTPATIENT
Start: 2024-10-09

## 2024-10-09 NOTE — TELEPHONE ENCOUNTER
Patient called in stating  that he would like his B-12 and supplies sent to his pharmacy.  Patient states that he uses the [facility] in [place].  Patient can be reached at [phone].        Thank You

## 2024-10-25 ENCOUNTER — HOSPITAL ENCOUNTER (OUTPATIENT)
Dept: RADIOLOGY | Facility: CLINIC | Age: 59
Discharge: HOME | End: 2024-10-25
Payer: COMMERCIAL

## 2024-10-25 DIAGNOSIS — Z87.891 PERSONAL HISTORY OF NICOTINE DEPENDENCE: ICD-10-CM

## 2024-10-25 PROCEDURE — 71271 CT THORAX LUNG CANCER SCR C-: CPT

## 2025-01-10 ENCOUNTER — LAB (OUTPATIENT)
Dept: LAB | Facility: LAB | Age: 60
End: 2025-01-10
Payer: COMMERCIAL

## 2025-01-10 ENCOUNTER — OFFICE VISIT (OUTPATIENT)
Dept: PRIMARY CARE | Facility: CLINIC | Age: 60
End: 2025-01-10
Payer: COMMERCIAL

## 2025-01-10 VITALS
DIASTOLIC BLOOD PRESSURE: 84 MMHG | RESPIRATION RATE: 20 BRPM | OXYGEN SATURATION: 94 % | WEIGHT: 233 LBS | BODY MASS INDEX: 29.92 KG/M2 | HEART RATE: 57 BPM | SYSTOLIC BLOOD PRESSURE: 140 MMHG | TEMPERATURE: 98.9 F

## 2025-01-10 DIAGNOSIS — E55.9 VITAMIN D DEFICIENCY: ICD-10-CM

## 2025-01-10 DIAGNOSIS — I10 HYPERTENSION, UNSPECIFIED TYPE: ICD-10-CM

## 2025-01-10 DIAGNOSIS — E78.5 HYPERLIPIDEMIA, UNSPECIFIED HYPERLIPIDEMIA TYPE: ICD-10-CM

## 2025-01-10 DIAGNOSIS — E53.8 VITAMIN B12 DEFICIENCY: Primary | ICD-10-CM

## 2025-01-10 DIAGNOSIS — M10.9 GOUT, UNSPECIFIED CAUSE, UNSPECIFIED CHRONICITY, UNSPECIFIED SITE: ICD-10-CM

## 2025-01-10 DIAGNOSIS — E53.8 VITAMIN B12 DEFICIENCY: ICD-10-CM

## 2025-01-10 LAB
25(OH)D3 SERPL-MCNC: 48 NG/ML (ref 30–100)
URATE SERPL-MCNC: 6.4 MG/DL (ref 4–7.5)
VIT B12 SERPL-MCNC: 287 PG/ML (ref 211–911)

## 2025-01-10 PROCEDURE — 82306 VITAMIN D 25 HYDROXY: CPT

## 2025-01-10 PROCEDURE — 99213 OFFICE O/P EST LOW 20 MIN: CPT | Performed by: FAMILY MEDICINE

## 2025-01-10 PROCEDURE — 82607 VITAMIN B-12: CPT

## 2025-01-10 PROCEDURE — 84550 ASSAY OF BLOOD/URIC ACID: CPT

## 2025-01-10 RX ORDER — SIMVASTATIN 20 MG/1
20 TABLET, FILM COATED ORAL EVERY EVENING
Qty: 90 TABLET | Refills: 1 | Status: SHIPPED | OUTPATIENT
Start: 2025-01-10 | End: 2025-07-09

## 2025-01-10 RX ORDER — INDOMETHACIN 50 MG/1
CAPSULE ORAL
Qty: 30 CAPSULE | Refills: 0 | Status: SHIPPED | OUTPATIENT
Start: 2025-01-10

## 2025-01-10 RX ORDER — METOPROLOL TARTRATE 50 MG/1
50 TABLET ORAL DAILY
Qty: 90 TABLET | Refills: 1 | Status: SHIPPED | OUTPATIENT
Start: 2025-01-10 | End: 2025-07-09

## 2025-01-10 RX ORDER — LOSARTAN POTASSIUM 50 MG/1
50 TABLET ORAL DAILY
Qty: 90 TABLET | Refills: 1 | Status: SHIPPED | OUTPATIENT
Start: 2025-01-10 | End: 2025-07-09

## 2025-01-10 ASSESSMENT — ENCOUNTER SYMPTOMS
OCCASIONAL FEELINGS OF UNSTEADINESS: 0
LOSS OF SENSATION IN FEET: 0
DEPRESSION: 0

## 2025-01-10 ASSESSMENT — PATIENT HEALTH QUESTIONNAIRE - PHQ9
SUM OF ALL RESPONSES TO PHQ9 QUESTIONS 1 AND 2: 0
2. FEELING DOWN, DEPRESSED OR HOPELESS: NOT AT ALL
1. LITTLE INTEREST OR PLEASURE IN DOING THINGS: NOT AT ALL

## 2025-01-10 NOTE — PROGRESS NOTES
Subjective   Patient ID: Christian Toro is a 59 y.o. male who presents for Follow-up and Foot Pain (Thinks he had a gout episode the weekend after [place] where his foot was extremely stiff and sore. It was swollen and throbbing. It is better now but wanted to mention. ).    HPI  Patient comes in today for reevaluation of his blood pressure.  He really has no way of checking it at home he claims and he does not feel like it has been elevated but it was elevated today on initial check and when I rechecked it with a large cuff was 140/84.  At this point will have him increase his losartan to 50 mg daily and continue with all the other medicines as is.    Patient also relates that he believes he had a gouty attack right after [place].  He thinks it was from all the rich foods he was eating during the holidays.  Will check his uric acid level today and since he did not have any Indocin for treating it will give him a prescription of that as well.    Review of Systems   All other systems reviewed and are negative.      Objective   Vitals:  /84   Pulse 57   Temp 37.2 °C (98.9 °F)   Resp 20   Wt 106 kg (233 lb)   SpO2 94%   BMI 29.92 kg/m²     Physical Exam  Vitals reviewed.   Constitutional:       Appearance: He is well-developed.   HENT:      Head: Normocephalic and atraumatic.      Right Ear: External ear normal.      Left Ear: External ear normal.      Nose: Nose normal.      Mouth/Throat:      Mouth: Mucous membranes are moist.      Pharynx: Oropharynx is clear.   Eyes:      Extraocular Movements: Extraocular movements intact.      Conjunctiva/sclera: Conjunctivae normal.      Pupils: Pupils are equal, round, and reactive to light.      Comments: Patient wears glasses   Cardiovascular:      Rate and Rhythm: Normal rate and regular rhythm.      Heart sounds: Normal heart sounds. No murmur heard.  Pulmonary:      Effort: Pulmonary effort is normal.      Breath sounds: Normal breath sounds. No wheezing.    Abdominal:      General: Abdomen is flat. Bowel sounds are normal.      Palpations: Abdomen is soft.   Musculoskeletal:         General: Normal range of motion.   Skin:     General: Skin is warm and dry.   Neurological:      General: No focal deficit present.      Mental Status: He is alert and oriented to person, place, and time. Mental status is at baseline.   Psychiatric:         Mood and Affect: Mood normal.         Behavior: Behavior normal.         Thought Content: Thought content normal.         Judgment: Judgment normal.         Assessment/Plan   Problem List Items Addressed This Visit       Hypertension    Relevant Medications    losartan (Cozaar) 50 mg tablet    metoprolol tartrate (Lopressor) 50 mg tablet    Hyperlipidemia    Relevant Medications    simvastatin (Zocor) 20 mg tablet    Vitamin B12 deficiency - Primary    Relevant Orders    Vitamin B12    Vitamin D deficiency    Relevant Orders    Vitamin D 25-Hydroxy,Total (for eval of Vitamin D levels)     Other Visit Diagnoses       Gout, unspecified cause, unspecified chronicity, unspecified site        Relevant Medications    indomethacin (Indocin) 50 mg capsule    Other Relevant Orders    Uric Acid        Will contact patient with lab results when available.  Take new dose of medication as directed.  Continue other medications as directed.  RTC in one month for recheck, sooner should problems arise.  Medication list reconciled.  Thank you for visiting today!      Professional services: Some of this note was completed using Dragon voice technology and sometimes the software misinterprets words. This may include unintended errors with respect to translation of words, typographical errors or grammar errors which may not have been identified prior to finalization of the chart note. Please take this into account when reading the note. Thank you.       Rajeev Spain DO 01/10/25 2:49 PM

## 2025-01-16 ENCOUNTER — TELEPHONE (OUTPATIENT)
Dept: PRIMARY CARE | Facility: CLINIC | Age: 60
End: 2025-01-16
Payer: COMMERCIAL

## 2025-01-16 DIAGNOSIS — I10 HYPERTENSION, UNSPECIFIED TYPE: ICD-10-CM

## 2025-01-16 DIAGNOSIS — E78.5 HYPERLIPIDEMIA, UNSPECIFIED HYPERLIPIDEMIA TYPE: ICD-10-CM

## 2025-01-16 DIAGNOSIS — K21.9 GASTROESOPHAGEAL REFLUX DISEASE WITHOUT ESOPHAGITIS: ICD-10-CM

## 2025-01-16 DIAGNOSIS — M10.9 GOUT, UNSPECIFIED CAUSE, UNSPECIFIED CHRONICITY, UNSPECIFIED SITE: ICD-10-CM

## 2025-01-16 RX ORDER — LOSARTAN POTASSIUM 50 MG/1
50 TABLET ORAL DAILY
Qty: 90 TABLET | Refills: 1 | Status: SHIPPED | OUTPATIENT
Start: 2025-01-16 | End: 2025-07-15

## 2025-01-16 RX ORDER — INDOMETHACIN 50 MG/1
CAPSULE ORAL
Qty: 90 CAPSULE | Refills: 0 | Status: SHIPPED | OUTPATIENT
Start: 2025-01-16

## 2025-01-16 RX ORDER — SIMVASTATIN 20 MG/1
20 TABLET, FILM COATED ORAL EVERY EVENING
Qty: 90 TABLET | Refills: 1 | Status: SHIPPED | OUTPATIENT
Start: 2025-01-16 | End: 2025-07-15

## 2025-01-16 RX ORDER — METOPROLOL TARTRATE 50 MG/1
50 TABLET ORAL DAILY
Qty: 90 TABLET | Refills: 1 | Status: SHIPPED | OUTPATIENT
Start: 2025-01-16 | End: 2025-07-15

## 2025-01-16 NOTE — TELEPHONE ENCOUNTER
Patient called in this morning stating that he gave us the wrong pharmacy information.  Patient states that he has [facility] not [facility].  I did change that in the system, but the patient needs all the prescription that were sent in on 1/10/25 to now be sent to [facility].  Patient can be reached at [phone].        Thank You

## 2025-01-16 NOTE — TELEPHONE ENCOUNTER
Requested Prescriptions     Pending Prescriptions Disp Refills    simvastatin (Zocor) 20 mg tablet 90 tablet 1     Sig: Take 1 tablet (20 mg) by mouth once daily in the evening.    metoprolol tartrate (Lopressor) 50 mg tablet 90 tablet 1     Sig: Take 1 tablet by mouth once daily.    losartan (Cozaar) 50 mg tablet 90 tablet 1     Sig: Take 1 tablet (50 mg) by mouth once daily.    indomethacin (Indocin) 50 mg capsule 90 capsule 0     Sig: Take capsule 3 times a day as needed with food for 3 to 4 days until gout attack subsides

## 2025-03-24 ENCOUNTER — APPOINTMENT (OUTPATIENT)
Dept: DERMATOLOGY | Facility: CLINIC | Age: 60
End: 2025-03-24
Payer: COMMERCIAL

## 2025-07-16 DIAGNOSIS — I10 HYPERTENSION, UNSPECIFIED TYPE: ICD-10-CM

## 2025-07-16 DIAGNOSIS — E78.5 HYPERLIPIDEMIA, UNSPECIFIED HYPERLIPIDEMIA TYPE: ICD-10-CM

## 2025-07-16 RX ORDER — METOPROLOL TARTRATE 50 MG/1
50 TABLET ORAL DAILY
Qty: 90 TABLET | Refills: 0 | Status: SHIPPED | OUTPATIENT
Start: 2025-07-16 | End: 2025-10-14

## 2025-07-16 RX ORDER — LOSARTAN POTASSIUM 50 MG/1
50 TABLET ORAL DAILY
Qty: 90 TABLET | Refills: 0 | Status: SHIPPED | OUTPATIENT
Start: 2025-07-16 | End: 2025-10-14

## 2025-07-16 RX ORDER — SIMVASTATIN 20 MG/1
20 TABLET, FILM COATED ORAL EVERY EVENING
Qty: 90 TABLET | Refills: 0 | Status: SHIPPED | OUTPATIENT
Start: 2025-07-16 | End: 2025-10-14

## 2025-07-16 NOTE — TELEPHONE ENCOUNTER
Rx Refill Request Telephone Encounter    Name:  Christian Toro  :  [id number]  Medication Name:    losartan (Cozaar) 50 mg tablet   metoprolol tartrate (Lopressor) 50 mg tablet   simvastatin (Zocor) 20 mg tablet     Specific Pharmacy location:    [facility] [address] Phone: [phone]   Fax: [phone]        Date of last appointment:  01/10/25  Date of next appointment:  2025  Best number to reach patient:  [phone]        Thank You

## 2025-07-16 NOTE — TELEPHONE ENCOUNTER
Requested Prescriptions     Pending Prescriptions Disp Refills    losartan (Cozaar) 50 mg tablet 90 tablet 0     Sig: Take 1 tablet (50 mg) by mouth once daily.    metoprolol tartrate (Lopressor) 50 mg tablet 90 tablet 0     Sig: Take 1 tablet by mouth once daily.    simvastatin (Zocor) 20 mg tablet 90 tablet 0     Sig: Take 1 tablet (20 mg) by mouth once daily in the evening.

## 2025-08-22 ENCOUNTER — APPOINTMENT (OUTPATIENT)
Dept: PRIMARY CARE | Facility: CLINIC | Age: 60
End: 2025-08-22
Payer: COMMERCIAL

## 2025-08-22 VITALS
BODY MASS INDEX: 29.92 KG/M2 | DIASTOLIC BLOOD PRESSURE: 90 MMHG | OXYGEN SATURATION: 95 % | SYSTOLIC BLOOD PRESSURE: 140 MMHG | TEMPERATURE: 98.1 F | HEART RATE: 52 BPM | RESPIRATION RATE: 20 BRPM | WEIGHT: 233 LBS

## 2025-08-22 DIAGNOSIS — I10 HYPERTENSION, ESSENTIAL, BENIGN: ICD-10-CM

## 2025-08-22 DIAGNOSIS — N40.0 BENIGN PROSTATIC HYPERPLASIA, UNSPECIFIED WHETHER LOWER URINARY TRACT SYMPTOMS PRESENT: ICD-10-CM

## 2025-08-22 DIAGNOSIS — E55.9 VITAMIN D DEFICIENCY: ICD-10-CM

## 2025-08-22 DIAGNOSIS — E53.8 VITAMIN B12 DEFICIENCY: ICD-10-CM

## 2025-08-22 DIAGNOSIS — E78.2 HYPERLIPIDEMIA, MIXED: ICD-10-CM

## 2025-08-22 DIAGNOSIS — K21.9 GASTROESOPHAGEAL REFLUX DISEASE WITHOUT ESOPHAGITIS: ICD-10-CM

## 2025-08-22 DIAGNOSIS — R53.83 FATIGUE, UNSPECIFIED TYPE: ICD-10-CM

## 2025-08-22 DIAGNOSIS — S76.212A INGUINAL STRAIN, LEFT, INITIAL ENCOUNTER: Primary | ICD-10-CM

## 2025-08-22 DIAGNOSIS — Z13.228 SCREENING FOR METABOLIC DISORDER: ICD-10-CM

## 2025-08-22 DIAGNOSIS — R73.9 HYPERGLYCEMIA: ICD-10-CM

## 2025-08-22 PROCEDURE — 99214 OFFICE O/P EST MOD 30 MIN: CPT | Performed by: FAMILY MEDICINE

## 2025-08-22 RX ORDER — IBUPROFEN 800 MG/1
800 TABLET, FILM COATED ORAL 3 TIMES DAILY PRN
Qty: 60 TABLET | Refills: 0 | Status: SHIPPED | OUTPATIENT
Start: 2025-08-22 | End: 2025-09-21

## 2025-08-22 ASSESSMENT — ENCOUNTER SYMPTOMS
DEPRESSION: 0
LOSS OF SENSATION IN FEET: 0
OCCASIONAL FEELINGS OF UNSTEADINESS: 0